# Patient Record
Sex: FEMALE | Race: OTHER | NOT HISPANIC OR LATINO | URBAN - METROPOLITAN AREA
[De-identification: names, ages, dates, MRNs, and addresses within clinical notes are randomized per-mention and may not be internally consistent; named-entity substitution may affect disease eponyms.]

---

## 2017-08-14 ENCOUNTER — EMERGENCY (EMERGENCY)
Facility: HOSPITAL | Age: 22
LOS: 1 days | Discharge: HOME | End: 2017-08-14
Attending: INTERNAL MEDICINE
Payer: SELF-PAY

## 2017-08-14 VITALS
WEIGHT: 210.1 LBS | HEART RATE: 98 BPM | DIASTOLIC BLOOD PRESSURE: 78 MMHG | TEMPERATURE: 99 F | SYSTOLIC BLOOD PRESSURE: 127 MMHG | RESPIRATION RATE: 16 BRPM | HEIGHT: 63 IN | OXYGEN SATURATION: 100 %
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
## 2017-08-14 DIAGNOSIS — R51 HEADACHE: ICD-10-CM

## 2017-08-14 DIAGNOSIS — M25.512 PAIN IN LEFT SHOULDER: ICD-10-CM
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
## 2017-08-14 PROCEDURE — 73030 X-RAY EXAM OF SHOULDER: CPT

## 2017-08-14 PROCEDURE — 73030 X-RAY EXAM OF SHOULDER: CPT | Mod: 26,LT

## 2017-08-14 PROCEDURE — 99284 EMERGENCY DEPT VISIT MOD MDM: CPT

## 2017-08-14 PROCEDURE — 99283 EMERGENCY DEPT VISIT LOW MDM: CPT

## 2017-08-14 RX ORDER — IBUPROFEN 200 MG
400 TABLET ORAL ONCE
Qty: 0 | Refills: 0 | Status: COMPLETED | OUTPATIENT
Start: 2017-08-14 | End: 2017-08-14

## 2017-08-14 RX ADMIN — Medication 400 MILLIGRAM(S): at 18:57

## 2017-08-14 RX ADMIN — Medication 400 MILLIGRAM(S): at 18:30
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
## 2017-08-14 NOTE — ED PROVIDER NOTE - MUSCULOSKELETAL, MLM
Spine appears normal, range of motion is not limited, minimal TTP L shoulder, full AROM, no bruising/swelling, minimal ecchymosis LLE, no bony TTP, neck/back no bony TTP, full AROM

## 2017-08-14 NOTE — ED PROVIDER NOTE - PROGRESS NOTE DETAILS
XR neg, ambulatory without difficulty, tolerating PO, requesting d/c home, will f/u with PMD in 2 days.
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
## 2019-06-27 NOTE — ED PROVIDER NOTE - EYES, MLM
----- Message from Meghan DAVIDSON MD sent at 6/27/2019 12:11 PM CDT -----  Please arrange pt to see therapist ASAP because she has depression and is Type 2 DM. Thanks.    Clear bilaterally, pupils equal, round and reactive to light.